# Patient Record
Sex: FEMALE | Race: BLACK OR AFRICAN AMERICAN | NOT HISPANIC OR LATINO | Employment: OTHER | URBAN - METROPOLITAN AREA
[De-identification: names, ages, dates, MRNs, and addresses within clinical notes are randomized per-mention and may not be internally consistent; named-entity substitution may affect disease eponyms.]

---

## 2021-11-06 ENCOUNTER — IMMUNIZATION (OUTPATIENT)
Dept: PRIMARY CARE CLINIC | Facility: CLINIC | Age: 81
End: 2021-11-06
Payer: MEDICARE

## 2021-11-06 DIAGNOSIS — Z23 NEED FOR VACCINATION: Primary | ICD-10-CM

## 2021-11-06 PROCEDURE — 0064A COVID-19, MRNA, LNP-S, PF, 100 MCG/0.25 ML DOSE VACCINE (MODERNA BOOSTER): CPT | Mod: CV19,PBBFAC | Performed by: INTERNAL MEDICINE

## 2022-01-10 ENCOUNTER — TELEPHONE (OUTPATIENT)
Dept: PULMONOLOGY | Facility: CLINIC | Age: 82
End: 2022-01-10
Payer: MEDICARE

## 2022-01-10 NOTE — TELEPHONE ENCOUNTER
Spoke with patient, informed her that I have received her message. I advised patient that I can schedule her appointment with Dr Villarreal on 1/21/22 for 1:00pm. Patient verbalized that she understand and excepted the appointment.

## 2022-01-10 NOTE — TELEPHONE ENCOUNTER
----- Message from Rebeca Cummins sent at 1/10/2022  3:44 PM CST -----  Evans Carcamo calling regarding Appointment Access  (message) for #breathing issues/Sarcoidosis. [phone]

## 2022-01-21 ENCOUNTER — OFFICE VISIT (OUTPATIENT)
Dept: PULMONOLOGY | Facility: CLINIC | Age: 82
End: 2022-01-21
Payer: MEDICARE

## 2022-01-21 VITALS
DIASTOLIC BLOOD PRESSURE: 76 MMHG | SYSTOLIC BLOOD PRESSURE: 128 MMHG | HEART RATE: 106 BPM | WEIGHT: 148.38 LBS | BODY MASS INDEX: 29.13 KG/M2 | HEIGHT: 60 IN | OXYGEN SATURATION: 95 %

## 2022-01-21 DIAGNOSIS — R06.09 DYSPNEA ON EXERTION: Primary | ICD-10-CM

## 2022-01-21 DIAGNOSIS — D86.9 SARCOID: ICD-10-CM

## 2022-01-21 PROCEDURE — 99203 OFFICE O/P NEW LOW 30 MIN: CPT | Mod: GC,S$GLB,, | Performed by: INTERNAL MEDICINE

## 2022-01-21 PROCEDURE — 99999 PR PBB SHADOW E&M-EST. PATIENT-LVL III: CPT | Mod: PBBFAC,GC,, | Performed by: INTERNAL MEDICINE

## 2022-01-21 PROCEDURE — 99999 PR PBB SHADOW E&M-EST. PATIENT-LVL III: ICD-10-PCS | Mod: PBBFAC,GC,, | Performed by: INTERNAL MEDICINE

## 2022-01-21 PROCEDURE — 99203 PR OFFICE/OUTPT VISIT, NEW, LEVL III, 30-44 MIN: ICD-10-PCS | Mod: GC,S$GLB,, | Performed by: INTERNAL MEDICINE

## 2022-01-21 RX ORDER — MUPIROCIN 20 MG/G
OINTMENT TOPICAL
COMMUNITY
Start: 2021-10-04

## 2022-01-21 RX ORDER — LOVASTATIN 40 MG/1
TABLET ORAL
COMMUNITY
Start: 2021-10-20

## 2022-01-21 RX ORDER — AZELASTINE 1 MG/ML
SPRAY, METERED NASAL
COMMUNITY
Start: 2021-10-01 | End: 2023-03-09

## 2022-01-21 RX ORDER — AMLODIPINE BESYLATE 5 MG/1
TABLET ORAL
COMMUNITY
Start: 2021-08-23

## 2022-01-21 RX ORDER — DENOSUMAB 60 MG/ML
INJECTION SUBCUTANEOUS
COMMUNITY
Start: 2021-09-22

## 2022-01-21 RX ORDER — OMEPRAZOLE 40 MG/1
CAPSULE, DELAYED RELEASE ORAL
COMMUNITY
Start: 2021-10-01 | End: 2023-03-09

## 2022-01-21 RX ORDER — SITAGLIPTIN 100 MG/1
TABLET, FILM COATED ORAL
COMMUNITY
Start: 2021-11-03

## 2022-01-21 NOTE — PROGRESS NOTES
[facility] - Pulmonary [facility] 9th Fl  Pulmonary Clinic Note    Subjective:      Reason for Visit: New patient, sarcoidosis    Evans Carcamo is a 81 y.o. female presenting as a new patient to pulmonary clinic for evaluation of sarcoidosis. Diagnosed at 17 via lung biopsy (per patient). Has been asymptomatic since. Never been on therapy. Currently takes MTX but this is for rheumatoid arthritis. Patient sees a private practice PCP on [facility], and all of her other doctors (rheumatology, ENT, ophthalmology) are at [facility]. States she was referred to [facility] pulm for insurance reasons. Reports progressive dyspnea on exertion over the past several years. Limits her ability to grocery shop, carry heavy objects, or walk longer distances. Associated with dry cough. Never smoker, denies history of heart or lung disease. Never been told she has heart failure and denies orthopnea or LE edema. Does endorse chronic sinus congestion with post nasal drip for which she takes Claritin and Azelastine nasal spray and sees ENT.    Last imaging in our system: CXR 2014, normal without hilar LAD, fibrosis, or scarring  No PFTs available for review, though patient states she has had PFTs completed in the past    PMH:  HTN, DM, hypercholesterolemia, RA    PSxH:  Prior lung biopsy    FH:  Brother with heart disease       Review of patient's allergies indicates:  Not on File     Review of Systems   Constitutional: Negative for chills, fever and weight loss.   HENT: Positive for congestion and nosebleeds.    Eyes: Negative for blurred vision and double vision.   Respiratory: Positive for cough and shortness of breath. Negative for hemoptysis, sputum production, wheezing and stridor.    Cardiovascular: Negative for chest pain, palpitations, leg swelling and PND.   Gastrointestinal: Negative for constipation, diarrhea, nausea and vomiting.   Genitourinary: Negative for dysuria and hematuria.   Skin: Negative for itching and rash.    Neurological: Negative for focal weakness and weakness.       Objective:      Vital Signs (Most Recent)       Vital Signs Range (Last 24H):  [unfilled]    Physical Exam  Constitutional:       General: She is not in acute distress.     Appearance: Normal appearance. She is not ill-appearing.   HENT:      Head: Normocephalic and atraumatic.      Mouth/Throat:      Mouth: Mucous membranes are moist.      Pharynx: Oropharynx is clear.   Eyes:      Extraocular Movements: Extraocular movements intact.      Pupils: Pupils are equal, round, and reactive to light.   Cardiovascular:      Rate and Rhythm: Normal rate and regular rhythm.   Pulmonary:      Effort: Pulmonary effort is normal. No respiratory distress.      Breath sounds: Normal breath sounds. No stridor. No wheezing or rhonchi.   Abdominal:      Palpations: Abdomen is soft.      Tenderness: There is no abdominal tenderness. There is no guarding.   Musculoskeletal:         General: Normal range of motion.      Cervical back: Normal range of motion and neck supple.      Right lower leg: No edema.      Left lower leg: No edema.   Skin:     General: Skin is warm and dry.   Neurological:      General: No focal deficit present.      Mental Status: She is alert and oriented to person, place, and time. Mental status is at baseline.   Psychiatric:         Mood and Affect: Mood normal.         Behavior: Behavior normal.         CXR (2014): Normal CXR    Assessment:      82 yo F with history as stated above presents to pulmonary clinic as a new patient. History of sarcoid diagnosed at age 17, reportedly asymptomatic since diagnosis, this seems to be consistent when reviewing CXR from 2014 which shows no abnormalities. She does report a progressive dyspnea on exertion over the past several years of unknown etiology. Highly unlikely to be related to sarcoidosis. Will evaluate with PFTs, 6-min walk and CXR imaging for now. Follow up pending results of these  studies.    Plan:      1. Dyspnea on exertion  - PFTs  - 6-min walk  - CXR    Patient seen and discussed with Dr. Davison. Follow up with pulmonary to be determined based on test results.    Saji Villarreal MD  Pulmonary / Critical Care, PGY-5

## 2022-02-24 ENCOUNTER — HOSPITAL ENCOUNTER (OUTPATIENT)
Dept: RADIOLOGY | Facility: HOSPITAL | Age: 82
Discharge: HOME | End: 2022-02-24
Attending: INTERNAL MEDICINE
Payer: MEDICARE

## 2022-02-24 DIAGNOSIS — R06.09 DYSPNEA ON EXERTION: ICD-10-CM

## 2022-02-24 PROCEDURE — 71046 XR CHEST PA AND LATERAL: ICD-10-PCS | Mod: 26,,, | Performed by: RADIOLOGY

## 2022-02-24 PROCEDURE — 71046 X-RAY EXAM CHEST 2 VIEWS: CPT | Mod: 26,,, | Performed by: RADIOLOGY

## 2022-02-24 PROCEDURE — 71046 X-RAY EXAM CHEST 2 VIEWS: CPT | Mod: TC,FY

## 2022-03-17 ENCOUNTER — TELEPHONE (OUTPATIENT)
Dept: PULMONOLOGY | Facility: CLINIC | Age: 82
End: 2022-03-17
Payer: MEDICARE

## 2022-03-17 NOTE — TELEPHONE ENCOUNTER
I spoke to patient to schedule pft's and 6mwt ordered by Dr Villarreal on 3/22/22 at 10am. Patient aware of rapid covid test prior. Patient confirmed and verbalized understanding.

## 2022-03-17 NOTE — TELEPHONE ENCOUNTER
----- Message from Kandy Waldrop sent at 3/17/2022 11:13 AM CDT -----  Contact: pt  Pt requesting call back re: scheduling PFT labs.     Confirmed contact below:  Contact Name:Evans Carcamo  Phone Number: [phone]

## 2022-03-22 ENCOUNTER — HOSPITAL ENCOUNTER (OUTPATIENT)
Dept: PULMONOLOGY | Facility: CLINIC | Age: 82
Discharge: HOME | End: 2022-03-22
Payer: MEDICARE

## 2022-03-22 VITALS — BODY MASS INDEX: 27.68 KG/M2 | WEIGHT: 146.63 LBS | HEIGHT: 61 IN

## 2022-03-22 DIAGNOSIS — R06.09 DYSPNEA ON EXERTION: ICD-10-CM

## 2022-03-22 PROCEDURE — 94727 GAS DIL/WSHOT DETER LNG VOL: CPT | Mod: S$GLB,,, | Performed by: INTERNAL MEDICINE

## 2022-03-22 PROCEDURE — 94010 BREATHING CAPACITY TEST: ICD-10-PCS | Mod: S$GLB,,, | Performed by: INTERNAL MEDICINE

## 2022-03-22 PROCEDURE — 94618 PULMONARY STRESS TESTING: ICD-10-PCS | Mod: S$GLB,,, | Performed by: INTERNAL MEDICINE

## 2022-03-22 PROCEDURE — 94727 PR PULM FUNCTION TEST BY GAS: ICD-10-PCS | Mod: S$GLB,,, | Performed by: INTERNAL MEDICINE

## 2022-03-22 PROCEDURE — 94729 PR C02/MEMBANE DIFFUSE CAPACITY: ICD-10-PCS | Mod: S$GLB,,, | Performed by: INTERNAL MEDICINE

## 2022-03-22 PROCEDURE — 94010 BREATHING CAPACITY TEST: CPT | Mod: S$GLB,,, | Performed by: INTERNAL MEDICINE

## 2022-03-22 PROCEDURE — 94618 PULMONARY STRESS TESTING: CPT | Mod: S$GLB,,, | Performed by: INTERNAL MEDICINE

## 2022-03-22 PROCEDURE — 94729 DIFFUSING CAPACITY: CPT | Mod: S$GLB,,, | Performed by: INTERNAL MEDICINE

## 2022-03-23 NOTE — PROCEDURES
Evans Carcamo is a 81 y.o.  female patient, who presents for a 6 minute walk test ordered by MD Phil.  The diagnosis is Qualify for Oxygen, Shortness of Breath.  The patient's BMI is 27.7 kg/m2.  Predicted distance (lower limit of normal) is 227.09 meters.      Test Results:    The test was completed without stopping.  The total time walked was 360 seconds.  During walking, the patient reported:  Dyspnea, Leg pain. The patient used no assistive devices  during testing.     03/22/2022---------Distance: 365.76 meters (1200 feet)     O2 Sat % Supplemental Oxygen Heart Rate Blood Pressure Emerita Scale   Pre-exercise  (Resting) 97 % Room Air 103 bpm 159/75 mmHg 3   During Exercise 98 % Room Air 108 bpm 132/63 mmHg 4   Post-exercise  (Recovery) 98 % Room Air  100 bpm   mmHg       Recovery Time: 89 seconds    Performing nurse/tech: Shilpa THORNTON      PREVIOUS STUDY:   The patient has not had a previous study.      CLINICAL INTERPRETATION:  Six minute walk distance is 365.76 meters (1200 feet) with somewhat heavy dyspnea.  During exercise, there was no significant desaturation while breathing room air.  Blood pressure decreased significantly and Heart rate remained stable with walking.  Hypertension and Tachycardia was present prior to exercise.  This may represent an abnormal cardiovascular response to exercise.  The patient reported non-pulmonary symptoms during exercise.  No previous study performed.  Based upon age and body mass index, exercise capacity is normal.

## 2022-03-29 ENCOUNTER — TELEPHONE (OUTPATIENT)
Dept: PULMONOLOGY | Facility: CLINIC | Age: 82
End: 2022-03-29
Payer: MEDICARE

## 2022-03-29 NOTE — TELEPHONE ENCOUNTER
Pt calling for pft's results from 3/23/22. I let her know I would send a message to Dr Villarreal for his review and to advise. Patient verbalized understanding.

## 2022-04-01 DIAGNOSIS — J84.9 INTERSTITIAL PULMONARY DISEASE, UNSPECIFIED: ICD-10-CM

## 2022-04-01 DIAGNOSIS — R05.9 COUGH: ICD-10-CM

## 2022-05-08 NOTE — PROGRESS NOTES
I have seen the patient, reviewed all pertinent data in Epic, and reviewed the fellow's history and physical, assessment, and plan. I agree with the findings and recommendations as documented.     08-May-2022 19:45

## 2022-06-10 ENCOUNTER — TELEPHONE (OUTPATIENT)
Dept: PULMONOLOGY | Facility: CLINIC | Age: 82
End: 2022-06-10
Payer: MEDICARE

## 2022-06-10 NOTE — TELEPHONE ENCOUNTER
I spoke with patient to let her know at this time I do not have any availability to reschedule her appointment with Dr Villarreal. Patient stated she rescheduled her ct scan for Monday, 6/13/22 at 10:15am at [place]. I let Ms Carcamo know when I have an available appointment I can follow up to schedule an appointment. Patient verbalized understanding.

## 2022-06-10 NOTE — TELEPHONE ENCOUNTER
----- Message from Manuela Rendon sent at 6/10/2022 12:18 PM CDT -----  Regarding: Appt  Contact: PT @ [phone]  Pt is calling to speak to someone in Dr. Villarreal office to r/s appt for today, 6/10/22 due to bad weather. No available appts in Epic. Please call.

## 2022-06-13 ENCOUNTER — HOSPITAL ENCOUNTER (OUTPATIENT)
Dept: RADIOLOGY | Facility: HOSPITAL | Age: 82
Discharge: HOME | End: 2022-06-13
Attending: INTERNAL MEDICINE
Payer: MEDICARE

## 2022-06-13 DIAGNOSIS — J84.9 INTERSTITIAL PULMONARY DISEASE, UNSPECIFIED: ICD-10-CM

## 2022-06-13 DIAGNOSIS — R05.9 COUGH: ICD-10-CM

## 2022-06-13 PROCEDURE — 71250 CT THORAX DX C-: CPT | Mod: TC

## 2022-06-22 DIAGNOSIS — R06.09 DYSPNEA ON EXERTION: Primary | ICD-10-CM

## 2022-06-22 DIAGNOSIS — J84.9 INTERSTITIAL LUNG DISEASE: ICD-10-CM

## 2022-06-22 NOTE — PROGRESS NOTES
Called Ms. Carcamo to discuss CT scan findings. There are areas of fibrosis/bronchiectasis as well as mild ILD process concerning for underlying NSIP. This in combination with patient's symptoms of dyspnea on exertion and PFTs showing restriction with reduced DLCO, may be early ILD in setting of rheumatoid arthritis. Patient also with a history of sarcoidosis, but I do not feel CT pattern is consistent with this. Additionally, seems patient's symptoms of dyspnea on exertion are out of proportion to disease burden seen on CT scan. Therefore, I have ordered a TTE to evaluate for underlying pulmonary hypertension. I would like for Ms. Carcamo to return to pulmonary clinic after her TTE to discuss results and treatment options. This has been explained in detail to Ms. Carcamo.    Saji Villarreal MD  Pulmonary / Critical Care, PGY-5

## 2022-06-25 ENCOUNTER — IMMUNIZATION (OUTPATIENT)
Dept: PRIMARY CARE CLINIC | Facility: CLINIC | Age: 82
End: 2022-06-25
Payer: MEDICARE

## 2022-06-25 DIAGNOSIS — Z23 NEED FOR VACCINATION: Primary | ICD-10-CM

## 2022-06-25 PROCEDURE — 91306 COVID-19, MRNA, LNP-S, PF, 100 MCG/0.25 ML DOSE VACCINE (MODERNA BOOSTER): CPT | Mod: PBBFAC | Performed by: INTERNAL MEDICINE

## 2022-06-25 PROCEDURE — 0064A COVID-19, MRNA, LNP-S, PF, 100 MCG/0.25 ML DOSE VACCINE (MODERNA BOOSTER): CPT | Mod: CV19,PBBFAC | Performed by: INTERNAL MEDICINE

## 2022-10-07 ENCOUNTER — OFFICE VISIT (OUTPATIENT)
Dept: PULMONOLOGY | Facility: CLINIC | Age: 82
End: 2022-10-07
Payer: MEDICARE

## 2022-10-07 VITALS
SYSTOLIC BLOOD PRESSURE: 128 MMHG | HEIGHT: 61 IN | OXYGEN SATURATION: 93 % | DIASTOLIC BLOOD PRESSURE: 68 MMHG | HEART RATE: 109 BPM | WEIGHT: 138.44 LBS | BODY MASS INDEX: 26.14 KG/M2

## 2022-10-07 DIAGNOSIS — J84.9 INTERSTITIAL LUNG DISEASE: Primary | ICD-10-CM

## 2022-10-07 DIAGNOSIS — R06.09 DYSPNEA ON EXERTION: ICD-10-CM

## 2022-10-07 PROBLEM — E11.9 TYPE 2 DIABETES MELLITUS WITHOUT COMPLICATION, WITHOUT LONG-TERM CURRENT USE OF INSULIN: Status: ACTIVE | Noted: 2022-10-07

## 2022-10-07 PROCEDURE — 3074F SYST BP LT 130 MM HG: CPT | Mod: CPTII,S$GLB,, | Performed by: INTERNAL MEDICINE

## 2022-10-07 PROCEDURE — 3288F FALL RISK ASSESSMENT DOCD: CPT | Mod: CPTII,S$GLB,, | Performed by: INTERNAL MEDICINE

## 2022-10-07 PROCEDURE — 3078F PR MOST RECENT DIASTOLIC BLOOD PRESSURE < 80 MM HG: ICD-10-PCS | Mod: CPTII,S$GLB,, | Performed by: INTERNAL MEDICINE

## 2022-10-07 PROCEDURE — 1101F PR PT FALLS ASSESS DOC 0-1 FALLS W/OUT INJ PAST YR: ICD-10-PCS | Mod: CPTII,S$GLB,, | Performed by: INTERNAL MEDICINE

## 2022-10-07 PROCEDURE — 1101F PT FALLS ASSESS-DOCD LE1/YR: CPT | Mod: CPTII,S$GLB,, | Performed by: INTERNAL MEDICINE

## 2022-10-07 PROCEDURE — 1159F MED LIST DOCD IN RCRD: CPT | Mod: CPTII,S$GLB,, | Performed by: INTERNAL MEDICINE

## 2022-10-07 PROCEDURE — 1126F AMNT PAIN NOTED NONE PRSNT: CPT | Mod: CPTII,S$GLB,, | Performed by: INTERNAL MEDICINE

## 2022-10-07 PROCEDURE — 1126F PR PAIN SEVERITY QUANTIFIED, NO PAIN PRESENT: ICD-10-PCS | Mod: CPTII,S$GLB,, | Performed by: INTERNAL MEDICINE

## 2022-10-07 PROCEDURE — 3074F PR MOST RECENT SYSTOLIC BLOOD PRESSURE < 130 MM HG: ICD-10-PCS | Mod: CPTII,S$GLB,, | Performed by: INTERNAL MEDICINE

## 2022-10-07 PROCEDURE — 99999 PR PBB SHADOW E&M-EST. PATIENT-LVL III: ICD-10-PCS | Mod: PBBFAC,,, | Performed by: INTERNAL MEDICINE

## 2022-10-07 PROCEDURE — 99213 PR OFFICE/OUTPT VISIT, EST, LEVL III, 20-29 MIN: ICD-10-PCS | Mod: S$GLB,,, | Performed by: INTERNAL MEDICINE

## 2022-10-07 PROCEDURE — 99213 OFFICE O/P EST LOW 20 MIN: CPT | Mod: S$GLB,,, | Performed by: INTERNAL MEDICINE

## 2022-10-07 PROCEDURE — 1159F PR MEDICATION LIST DOCUMENTED IN MEDICAL RECORD: ICD-10-PCS | Mod: CPTII,S$GLB,, | Performed by: INTERNAL MEDICINE

## 2022-10-07 PROCEDURE — 3078F DIAST BP <80 MM HG: CPT | Mod: CPTII,S$GLB,, | Performed by: INTERNAL MEDICINE

## 2022-10-07 PROCEDURE — 3288F PR FALLS RISK ASSESSMENT DOCUMENTED: ICD-10-PCS | Mod: CPTII,S$GLB,, | Performed by: INTERNAL MEDICINE

## 2022-10-07 PROCEDURE — 99999 PR PBB SHADOW E&M-EST. PATIENT-LVL III: CPT | Mod: PBBFAC,,, | Performed by: INTERNAL MEDICINE

## 2022-10-07 RX ORDER — PREDNISONE 20 MG/1
40 TABLET ORAL DAILY
Qty: 5 TABLET | Refills: 0 | Status: SHIPPED | OUTPATIENT
Start: 2022-10-07 | End: 2023-03-09

## 2022-10-07 NOTE — PROGRESS NOTES
Tobacco/vape:  Occupation:  Exertional capacity:  Prior lung disease:  Sputum production:  Allergies/sinusitis:  GERD/Aspiration:  Pets:  Travel/TB:  Respiratory regimen:  Prior cancer:  Prior hospitalization/intubation:  Snoring/apnea:

## 2022-10-07 NOTE — PROGRESS NOTES
Subjective:       Patient ID: Evans Carcamo is a 82 y.o. female.    Chief Complaint: Shortness of Breath    HPI  Evans Carcamo has a past medical history of RA, sarcoid as a teenager, DM2 controlled, HTN, HLD    Tobacco/vape: never smoked, says she has some second hand exposure with ex- but was not around him long periods  Occupation: Pre-   Exertional capacity: 10 ft (recent change over the last two weeks, was functional able to walk through [employer] with some difficulty prior to new onset)  Prior lung disease: Was told sarcoid as a teenager, was never treated for it or given anything, Was told by Dr. Villarreal she had RA affecting her lungs.   Sputum production: Never  Allergies/sinusitis:Some history of allergies with the seasons, but not anymore  GERD/Aspiration: No reflux, or trouble with swallowing, recent nausea, says for inflammation in her stomach  Pets: Daughter has 6 dogs, no increased symptoms around pets  Travel/TB: No extensive travel or living in foreign country  Respiratory regimen: never had an inhaler or breathing treatment  Prior cancer: None  Prior hospitalization/intubation:No prior hospitalizations for issues with her lungs or requiring intubation.  Snoring/apnea: Bad insomnia, No one sleeps with her, she does not know of episodes that she stops breathing    Today she is Very tried, drained, weak, no energy, feels better than when she was in the ED, From SOB has improved, no cough, worse early in the morning and with exertion. Can walk about 10 ft before she gets SOB, but does not have it at rest. No LE swelling. No sores on hands. SOB is all the time, nothing makes it better, activities makes worse. COVID 2 years ago, did not have to go to the hospital, did not have any long term side effects of it.    Review of Systems   Constitutional:  Positive for activity change (decreased due to SOB) and fatigue. Negative for fever and chills.   HENT:  Negative for rhinorrhea,  "sinus pressure, sore throat and trouble swallowing.    Respiratory:  Positive for previous hospitalization due to pulmonary problems and somnolence. Negative for apnea, snoring, cough, sputum production, shortness of breath, orthopnea, use of rescue inhaler and Paroxysmal Nocturnal Dyspnea.    Cardiovascular:  Negative for chest pain and leg swelling.   Musculoskeletal:  Negative for gait problem and myalgias.   Gastrointestinal:  Positive for nausea and vomiting. Negative for acid reflux.   Psychiatric/Behavioral:  Negative for confusion. The patient is not nervous/anxious.      Objective:      Vitals:    10/07/22 0857   BP: 128/68   BP Location: Right arm   Patient Position: Sitting   Pulse: 109   SpO2: (!) 93%   Weight: 62.8 kg (138 lb 7.2 oz)   Height: 5' 1" (1.549 m)       Physical Exam   Constitutional: She is oriented to person, place, and time. She appears well-developed and well-nourished. No distress.   HENT:   Head: Normocephalic.   Cardiovascular: Regular rhythm and normal heart sounds.   No murmur heard.  tachycardic   Pulmonary/Chest: Breath sounds normal. No respiratory distress (mild increase in work of breathing). She has no decreased breath sounds. She has no wheezes.   Pt had increased work of breathing and was breathing slightly heavy. No abnormal breath sounds noted. Good air movement in lungs     Abdominal: Soft. Bowel sounds are normal. She exhibits no distension. There is no abdominal tenderness.   Musculoskeletal:         General: No edema.   Neurological: She is alert and oriented to person, place, and time.   Skin: Skin is warm and dry.   Psychiatric: She has a normal mood and affect. Her behavior is normal. Judgment and thought content normal.     Personal Diagnostic Review  PFTs   FEV1/FVC - 85%  FEV1 - 1.29L (89%)  FVC - 1.52L (79%)  TLC - 2.5L (56%)  DLCO - 58%  Bronchodilators:    CT Thorax 6/13/2022  ---There is mild bilateral panlobar bronchiectasis, and there is a small area of " paraspinal medial right lower lobe fibrosis and associated traction bronchiectasis. ---There is also milder but more generalized interstitial disease, with scattered mild peripheral panlobar interlobular septal thickening, in a pattern which may represent nonspecific interstitial pneumonia (NSIP). ---There are 3 mm noncalcified anterolateral and posterolateral inferior right upper lobe pulmonary nodules: Assuming that no prior chest CT scan exists, for patients at low risk (minimal or absent history of smoking and of other known risk factors), no routine follow-up is indicated. For patients at high risk (history of smoking or of other known risk factors), consider optional CT Chest at 12 months. Reference: Dima H, et al. Guidelines for Management of Incidental Pulmonary Nodules Detected on CT Images: From the Fleischner Society 2017. Radiology. 2017;284(1):228-243. ---There is also a large gallstone,  and additional chronic findings as described above.     CT Thorax 9/2022 ([facility] [place] with no images available)  There is normal enhancement of the main pulmonary arteries and branches.   Atherosclerosis of the aorta with no dissection. Calcified coronary arteries. Heart size is within normal limits. There is interlobular and intralobular septal thickening throughout both lungs, mostly in the periphery. There is no focal airspace consolidation. No significant pleural effusions.   There is no evidence of adenopathy.     Thoracic spondylosis.     Pulmonary Function Tests 10/7/2022   SpO2 93   Height 61   Weight 2215.18   BMI (Calculated) 26.2   Some recent data might be hidden         Assessment:       1. Interstitial lung disease    2. Dyspnea on exertion        Outpatient Encounter Medications as of 10/7/2022   Medication Sig Dispense Refill    amLODIPine (NORVASC) 5 MG tablet       azelastine (ASTELIN) 137 mcg (0.1 %) nasal spray       JANUVIA 100 mg Tab       lovastatin (MEVACOR) 40 MG tablet       mupirocin  (BACTROBAN) 2 % ointment       omeprazole (PRILOSEC) 40 MG capsule       PROLIA 60 mg/mL Syrg       predniSONE (DELTASONE) 20 MG tablet Take 2 tablets (40 mg total) by mouth once daily. 5 tablet 0     No facility-administered encounter medications on file as of 10/7/2022.     Orders Placed This Encounter   Procedures    Complete PFT w/ bronchodilator     Standing Status:   Future     Standing Expiration Date:   10/7/2023     Order Specific Question:   Release to patient     Answer:   Immediate    Six Minute Walk Test to qualify for Home Oxygen     Standing Status:   Future     Standing Expiration Date:   10/7/2023     Order Specific Question:   Release to patient     Answer:   Immediate       Plan:       - 6MWT  - PFTs repeat  - get TTE (previously ordered by Dr. Villarreal)  - patient asked to bring CD from CT thorax at [facility]  - Placed pt on 40mg prednisone for 5 days.  - Follow-up in a month to look for improvement in symptoms.

## 2022-10-11 ENCOUNTER — TELEPHONE (OUTPATIENT)
Dept: PULMONOLOGY | Facility: CLINIC | Age: 82
End: 2022-10-11
Payer: MEDICARE

## 2022-10-11 NOTE — TELEPHONE ENCOUNTER
----- Message from Rachel West sent at 10/11/2022 12:56 PM CDT -----  Evans Carcamo calling regarding Appointment Access  (message) for for the PFT and the Six minute walk, was unable to schedule for the PFT for the same day, call back [phone]

## 2022-10-11 NOTE — TELEPHONE ENCOUNTER
I spoke with patient. Ms Carcamo will call [facility] to get pft's and 6mwt scheduled prior to 1mth f/u with Dr Rodriguez on 11/4/22 at 1pm. Patient verbalized understanding.

## 2022-10-18 ENCOUNTER — HOSPITAL ENCOUNTER (OUTPATIENT)
Dept: PULMONOLOGY | Facility: HOSPITAL | Age: 82
Discharge: HOME | End: 2022-10-18
Attending: INTERNAL MEDICINE
Payer: MEDICARE

## 2022-10-18 DIAGNOSIS — J84.9 INTERSTITIAL LUNG DISEASE: ICD-10-CM

## 2022-10-18 PROCEDURE — 94618 PULMONARY STRESS TESTING: CPT | Mod: 26,,, | Performed by: INTERNAL MEDICINE

## 2022-10-18 PROCEDURE — 94729 DIFFUSING CAPACITY: CPT

## 2022-10-18 PROCEDURE — 94729 DIFFUSING CAPACITY: CPT | Mod: 26,,, | Performed by: INTERNAL MEDICINE

## 2022-10-18 PROCEDURE — 94010 BREATHING CAPACITY TEST: CPT | Mod: 26,59,, | Performed by: INTERNAL MEDICINE

## 2022-10-18 PROCEDURE — 94010 BREATHING CAPACITY TEST: ICD-10-PCS | Mod: 26,59,, | Performed by: INTERNAL MEDICINE

## 2022-10-18 PROCEDURE — 94640 AIRWAY INHALATION TREATMENT: CPT

## 2022-10-18 PROCEDURE — 94618 PULMONARY STRESS TESTING: CPT

## 2022-10-18 PROCEDURE — 94727 GAS DIL/WSHOT DETER LNG VOL: CPT | Mod: 26,,, | Performed by: INTERNAL MEDICINE

## 2022-10-18 PROCEDURE — 94010 BREATHING CAPACITY TEST: CPT

## 2022-10-18 PROCEDURE — 94727 GAS DIL/WSHOT DETER LNG VOL: CPT

## 2022-10-18 PROCEDURE — 94729 PR C02/MEMBANE DIFFUSE CAPACITY: ICD-10-PCS | Mod: 26,,, | Performed by: INTERNAL MEDICINE

## 2022-10-18 PROCEDURE — 94727 PR PULM FUNCTION TEST BY GAS: ICD-10-PCS | Mod: 26,,, | Performed by: INTERNAL MEDICINE

## 2022-10-18 PROCEDURE — 94618 PULMONARY STRESS TESTING: ICD-10-PCS | Mod: 26,,, | Performed by: INTERNAL MEDICINE

## 2022-10-20 LAB
BRPFT: ABNORMAL
DLCO ADJ PRE: 10.87 ML/(MIN*MMHG) (ref 11.8–23.27)
DLCO SINGLE BREATH LLN: 11.8
DLCO SINGLE BREATH PRE REF: 60.4 %
DLCO SINGLE BREATH REF: 17.53
DLCOC SBVA LLN: 2.39
DLCOC SBVA PRE REF: 118.3 %
DLCOC SBVA REF: 3.95
DLCOC SINGLE BREATH LLN: 11.8
DLCOC SINGLE BREATH PRE REF: 62 %
DLCOC SINGLE BREATH REF: 17.53
DLCOVA LLN: 2.39
DLCOVA PRE REF: 115.3 %
DLCOVA PRE: 4.55 ML/(MIN*MMHG*L) (ref 2.39–5.51)
DLCOVA REF: 3.95
DLVAADJ PRE: 4.67 ML/(MIN*MMHG*L) (ref 2.39–5.51)
ERVN2 LLN: -16449.58
ERVN2 PRE REF: 64.3 %
ERVN2 PRE: 0.27 L (ref -16449.58–16450.42)
ERVN2 REF: 0.42
FEF 25 75 LLN: 0.39
FEF 25 75 PRE REF: 108.5 %
FEF 25 75 REF: 1.21
FEV1 FVC LLN: 63
FEV1 FVC PRE REF: 106.2 %
FEV1 FVC REF: 77
FEV1 LLN: 0.95
FEV1 PRE REF: 73.8 %
FEV1 REF: 1.45
FRCN2 LLN: 1.73
FRCN2 PRE REF: 37.9 %
FRCN2 REF: 2.56
FVC LLN: 1.26
FVC PRE REF: 68.7 %
FVC REF: 1.89
IVC PRE: 1.31 L (ref 1.26–2.52)
IVC SINGLE BREATH LLN: 1.26
IVC SINGLE BREATH PRE REF: 69.6 %
IVC SINGLE BREATH REF: 1.89
PEF LLN: 1.32
PEF PRE REF: 113.4 %
PEF REF: 3.14
PRE DLCO: 10.59 ML/(MIN*MMHG) (ref 11.8–23.27)
PRE FEF 25 75: 1.32 L/S (ref 0.39–2.04)
PRE FET 100: 6.57 SEC
PRE FEV1 FVC: 82.33 % (ref 62.76–92.23)
PRE FEV1: 1.07 L (ref 0.95–1.94)
PRE FRC N2: 0.97 L
PRE FVC: 1.3 L (ref 1.26–2.52)
PRE PEF: 3.56 L/S (ref 1.32–4.96)
RVN2 LLN: 1.56
RVN2 PRE REF: 21.5 %
RVN2 PRE: 0.46 L (ref 1.56–2.71)
RVN2 REF: 2.13
RVN2TLCN2 LLN: 37.59
RVN2TLCN2 PRE REF: 52.7 %
RVN2TLCN2 PRE: 24.88 % (ref 37.59–56.77)
RVN2TLCN2 REF: 47.18
TLCN2 LLN: 3.45
TLCN2 PRE REF: 41.5 %
TLCN2 PRE: 1.84 L (ref 3.45–5.43)
TLCN2 REF: 4.44
VA PRE: 2.33 L (ref 4.29–4.29)
VA SINGLE BREATH LLN: 4.29
VA SINGLE BREATH PRE REF: 54.2 %
VA SINGLE BREATH REF: 4.29
VCMAXN2 LLN: 1.26
VCMAXN2 PRE REF: 73.3 %
VCMAXN2 PRE: 1.38 L (ref 1.26–2.52)
VCMAXN2 REF: 1.89

## 2022-10-20 NOTE — PROCEDURES
Evans Carcamo is a 82 y.o.   female patient, who presents for a 6 minute walk test ordered by Dr Castillo .  The diagnosis is  COPD.  The patient's BMI is  26 kg/m2.    Predicted distance (lower limit of normal) is  251 meters.      Test Results:    The test was completed .completed  The patient stopped  0 times for a total of 0 seconds.  The total time walked was 360 seconds.  During walking, the patient reported:   no issues.    10/20/2022---------Distance:   (235.31m )     O2 Sat % Supplemental Oxygen Heart Rate Blood Pressure Emerita Scale   Pre-exercise  (Resting)  96  139/68 4   During Exercise 95 RA  106 128/60 6   Post-exercise  (Recovery) 96  130/62 4     Recovery Time:  35 seconds    Performing nurse/tech:  ROBYN Pittman [facility]      SUMMARY/INTERPRETATION:    Total distance walked: 235  meters  Predicted distance: 390  meters  Percentage predicted: 60 %    PREVIOUS STUDY:     Date:  2/2022  Prior 6 minute walk:   355meters  Percentage change:      The patient had a previous study.      CLINICAL INTERPRETATION:  Six minute walk distance is   (235 ) with somewhat heavy dyspnea.  Since the previous study in 2/2022, exercise capacity may be somewhat worse.    No desaturation on 6 minute walk.

## 2022-10-25 ENCOUNTER — TELEPHONE (OUTPATIENT)
Dept: PULMONOLOGY | Facility: CLINIC | Age: 82
End: 2022-10-25
Payer: MEDICARE

## 2022-10-25 NOTE — TELEPHONE ENCOUNTER
I spoke with patient. I let her know appointment with Dr Rodriguez on 11/4/22 at 1pm needs to changed to 12/6/22 at 4pm due to schedule change. Appointment mailed. Patient verbalized understanding.

## 2022-12-06 ENCOUNTER — OFFICE VISIT (OUTPATIENT)
Dept: PULMONOLOGY | Facility: CLINIC | Age: 82
End: 2022-12-06
Payer: MEDICARE

## 2022-12-06 VITALS
DIASTOLIC BLOOD PRESSURE: 70 MMHG | HEIGHT: 61 IN | HEART RATE: 107 BPM | WEIGHT: 143.31 LBS | BODY MASS INDEX: 27.06 KG/M2 | OXYGEN SATURATION: 95 % | SYSTOLIC BLOOD PRESSURE: 128 MMHG

## 2022-12-06 DIAGNOSIS — R06.09 DYSPNEA ON EXERTION: ICD-10-CM

## 2022-12-06 DIAGNOSIS — K21.9 GASTROESOPHAGEAL REFLUX DISEASE WITHOUT ESOPHAGITIS: ICD-10-CM

## 2022-12-06 DIAGNOSIS — G47.00 INSOMNIA, UNSPECIFIED TYPE: ICD-10-CM

## 2022-12-06 DIAGNOSIS — J84.9 INTERSTITIAL PULMONARY DISEASE, UNSPECIFIED: Primary | ICD-10-CM

## 2022-12-06 PROCEDURE — 1126F AMNT PAIN NOTED NONE PRSNT: CPT | Mod: CPTII,S$GLB,, | Performed by: INTERNAL MEDICINE

## 2022-12-06 PROCEDURE — 1160F PR REVIEW ALL MEDS BY PRESCRIBER/CLIN PHARMACIST DOCUMENTED: ICD-10-PCS | Mod: CPTII,S$GLB,, | Performed by: INTERNAL MEDICINE

## 2022-12-06 PROCEDURE — 3078F PR MOST RECENT DIASTOLIC BLOOD PRESSURE < 80 MM HG: ICD-10-PCS | Mod: CPTII,S$GLB,, | Performed by: INTERNAL MEDICINE

## 2022-12-06 PROCEDURE — 99999 PR PBB SHADOW E&M-EST. PATIENT-LVL IV: CPT | Mod: PBBFAC,,, | Performed by: INTERNAL MEDICINE

## 2022-12-06 PROCEDURE — 3288F FALL RISK ASSESSMENT DOCD: CPT | Mod: CPTII,S$GLB,, | Performed by: INTERNAL MEDICINE

## 2022-12-06 PROCEDURE — 99214 PR OFFICE/OUTPT VISIT, EST, LEVL IV, 30-39 MIN: ICD-10-PCS | Mod: S$GLB,,, | Performed by: INTERNAL MEDICINE

## 2022-12-06 PROCEDURE — 1101F PT FALLS ASSESS-DOCD LE1/YR: CPT | Mod: CPTII,S$GLB,, | Performed by: INTERNAL MEDICINE

## 2022-12-06 PROCEDURE — 3074F PR MOST RECENT SYSTOLIC BLOOD PRESSURE < 130 MM HG: ICD-10-PCS | Mod: CPTII,S$GLB,, | Performed by: INTERNAL MEDICINE

## 2022-12-06 PROCEDURE — 1126F PR PAIN SEVERITY QUANTIFIED, NO PAIN PRESENT: ICD-10-PCS | Mod: CPTII,S$GLB,, | Performed by: INTERNAL MEDICINE

## 2022-12-06 PROCEDURE — 3074F SYST BP LT 130 MM HG: CPT | Mod: CPTII,S$GLB,, | Performed by: INTERNAL MEDICINE

## 2022-12-06 PROCEDURE — 99214 OFFICE O/P EST MOD 30 MIN: CPT | Mod: S$GLB,,, | Performed by: INTERNAL MEDICINE

## 2022-12-06 PROCEDURE — 1159F MED LIST DOCD IN RCRD: CPT | Mod: CPTII,S$GLB,, | Performed by: INTERNAL MEDICINE

## 2022-12-06 PROCEDURE — 3078F DIAST BP <80 MM HG: CPT | Mod: CPTII,S$GLB,, | Performed by: INTERNAL MEDICINE

## 2022-12-06 PROCEDURE — 1159F PR MEDICATION LIST DOCUMENTED IN MEDICAL RECORD: ICD-10-PCS | Mod: CPTII,S$GLB,, | Performed by: INTERNAL MEDICINE

## 2022-12-06 PROCEDURE — 1160F RVW MEDS BY RX/DR IN RCRD: CPT | Mod: CPTII,S$GLB,, | Performed by: INTERNAL MEDICINE

## 2022-12-06 PROCEDURE — 1101F PR PT FALLS ASSESS DOC 0-1 FALLS W/OUT INJ PAST YR: ICD-10-PCS | Mod: CPTII,S$GLB,, | Performed by: INTERNAL MEDICINE

## 2022-12-06 PROCEDURE — 3288F PR FALLS RISK ASSESSMENT DOCUMENTED: ICD-10-PCS | Mod: CPTII,S$GLB,, | Performed by: INTERNAL MEDICINE

## 2022-12-06 PROCEDURE — 99999 PR PBB SHADOW E&M-EST. PATIENT-LVL IV: ICD-10-PCS | Mod: PBBFAC,,, | Performed by: INTERNAL MEDICINE

## 2022-12-06 RX ORDER — SOLIFENACIN SUCCINATE 5 MG/1
5 TABLET, FILM COATED ORAL
COMMUNITY
Start: 2022-10-27 | End: 2023-10-27

## 2022-12-06 RX ORDER — LISINOPRIL 2.5 MG/1
2.5 TABLET ORAL
COMMUNITY
Start: 2022-07-01 | End: 2023-03-09

## 2022-12-06 RX ORDER — METHOTREXATE 2.5 MG/1
12.5 TABLET ORAL WEEKLY
COMMUNITY
Start: 2022-10-06

## 2022-12-06 NOTE — PROGRESS NOTES
Subjective:       Patient ID: Evans Carcamo is a 82 y.o. female.    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    Tobacco/vape: never   Occupation: Pre-   Exertional capacity: limited severely due to shortness of breath. Able to walk to clinic from parking lot.  Prior lung disease: Was told sarcoid as a teenager, RA affecting her lungs.   Sputum production: Never  Allergies/sinusitis:Some history of allergies with the seasons, but not anymore  GERD/Aspiration: taking omeprazole  Pets: Daughter has 6 dogs, no increased symptoms around pets  Travel/TB: No extensive travel or living in foreign country  Respiratory regimen: never had an inhaler or breathing treatment  Prior cancer: None  Prior hospitalization/intubation:No prior hospitalizations for issues with her lungs or requiring intubation.  Snoring/apnea: Bad insomnia, No one sleeps with her, she does not know of episodes that she stops breathing    Today she is doing ok, but is having difficulty with sleep.  She attributes anxiety about day to day life as the cause.  Also with continued shortness of breath.    Review of Systems   Constitutional:  Positive for activity change (decreased due to SOB) and fatigue. Negative for fever and chills.   HENT:  Negative for rhinorrhea, sinus pressure, sore throat and trouble swallowing.    Respiratory:  Negative for apnea, snoring, cough, sputum production, shortness of breath, orthopnea, previous hospitialization due to pulmonary problems, use of rescue inhaler, somnolence and Paroxysmal Nocturnal Dyspnea.    Cardiovascular:  Negative for chest pain and leg swelling.   Musculoskeletal:  Negative for gait problem and myalgias.   Gastrointestinal:  Positive for nausea and vomiting. Negative for acid reflux.   Psychiatric/Behavioral:  Negative for confusion. The patient is  "not nervous/anxious.      Objective:      Vitals:    12/06/22 1550   BP: 128/70   BP Location: Right arm   Patient Position: Sitting   Pulse: 107   SpO2: 95%   Weight: 65 kg (143 lb 4.8 oz)   Height: 5' 1" (1.549 m)         Physical Exam   Constitutional: She is oriented to person, place, and time. She appears well-developed and well-nourished. No distress.   HENT:   Head: Normocephalic.   Cardiovascular: Normal rate, regular rhythm and normal heart sounds.   No murmur heard.  Pulmonary/Chest: Breath sounds normal. No respiratory distress (mild increase in work of breathing). She has no decreased breath sounds. She has no wheezes.   No abnormal breath sounds noted. Good air movement in lungs     Abdominal: Soft. Bowel sounds are normal. She exhibits no distension. There is no abdominal tenderness.   Musculoskeletal:         General: No edema.   Neurological: She is alert and oriented to person, place, and time.   Skin: Skin is warm and dry.   Psychiatric: She has a normal mood and affect. Her behavior is normal. Judgment and thought content normal.     Personal Diagnostic Review    PFTs 10/20/2022  FEV1/FVC - 82%  FEV1 - 1.07 (73%)  FVC - 1.3L (68%)  TLC - 1.84L (41%)  DLCO - 62%  Bronchodilators:     PFTs 3/22/2022  FEV1/FVC - 85%  FEV1 - 1.29L (89%)  FVC - 1.52L (79%)  TLC - 2.5L (56%)  DLCO - 58%  Bronchodilators:    CT Thorax 6/13/2022  Bronchiectasis associated with interstitial disease (peripheral panlobular interlobular septal thickening) NSIP. 3mm non-calcifiec anterolateral and posterolateral inferior right upper lobe pulmonary nodules.    CT Thorax 9/2022 ([facility] records with no images available)  There is normal enhancement of the main pulmonary arteries and branches.   Atherosclerosis of the aorta with no dissection. Calcified coronary arteries. Heart size is within normal limits. There is interlobular and intralobular septal thickening throughout both lungs, mostly in the periphery. There is no focal " airspace consolidation. No significant pleural effusions.   There is no evidence of adenopathy.     Thoracic spondylosis.       6MWT 10/18/2022  No significant desaturation noted 96% -> 95% -> 96% (distance 235M).    No flowsheet data found.        Assessment:       1. Interstitial pulmonary disease, unspecified    2. Insomnia, unspecified type    3. Dyspnea on exertion    4. Gastroesophageal reflux disease without esophagitis          Outpatient Encounter Medications as of 12/6/2022   Medication Sig Dispense Refill    amLODIPine (NORVASC) 5 MG tablet       azelastine (ASTELIN) 137 mcg (0.1 %) nasal spray       JANUVIA 100 mg Tab       lisinopriL (PRINIVIL,ZESTRIL) 2.5 MG tablet Take 2.5 mg by mouth.      lovastatin (MEVACOR) 40 MG tablet       methotrexate 2.5 MG Tab Take 12.5 mg by mouth once a week.      mupirocin (BACTROBAN) 2 % ointment       omeprazole (PRILOSEC) 40 MG capsule       PROLIA 60 mg/mL Syrg       solifenacin (VESICARE) 5 MG tablet Take 5 mg by mouth.      predniSONE (DELTASONE) 20 MG tablet Take 2 tablets (40 mg total) by mouth once daily. (Patient not taking: Reported on 12/6/2022) 5 tablet 0     No facility-administered encounter medications on file as of 12/6/2022.     Orders Placed This Encounter   Procedures    CT Chest Without Contrast     Standing Status:   Future     Standing Expiration Date:   12/6/2023     Order Specific Question:   May the Radiologist modify the order per protocol to meet the clinical needs of the patient?     Answer:   Yes    Ambulatory referral/consult to Sleep Disorders     Standing Status:   Future     Standing Expiration Date:   1/6/2024     Referral Priority:   Routine     Referral Type:   Consultation     Requested Specialty:   Sleep Medicine     Number of Visits Requested:   1         Plan:       Problem List Items Addressed This Visit          Pulmonary    Interstitial pulmonary disease, unspecified - Primary    Overview     Reported history of sarcoid as a  young woman (teen years) diagnosed by what sounds like a bronchoscopy.  Currently with a more NSIP pattern on CT thorax that likely is attributable to her RA.  This is being managed by rheumatology with MTX  - continue MTX  - PFTs with decline in FVC, TLC.  DLCO improved.  Will repeat CT thorax at this time, as it has been 6 months since last study.  If fibrosis is worsening will discuss with rheum and consider initiation of anti-fibrotic therapy.  Patient is apprehensive about addition of more medications.  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    - continue therapy for RA            GI    Gastroesophageal reflux disease without esophagitis    Overview     Continue PPI            Other    Insomnia    Overview     Referral to sleep medicine for frequent nocturnal awakenings, and inadequate sleep concerning for insomnia.            RTC 3 months or sooner PRN    Bharat Rodriguez MD  [facility]

## 2022-12-13 ENCOUNTER — HOSPITAL ENCOUNTER (OUTPATIENT)
Dept: RADIOLOGY | Facility: HOSPITAL | Age: 82
Discharge: HOME | End: 2022-12-13
Attending: INTERNAL MEDICINE
Payer: MEDICARE

## 2022-12-13 DIAGNOSIS — J84.9 INTERSTITIAL PULMONARY DISEASE, UNSPECIFIED: ICD-10-CM

## 2022-12-13 PROCEDURE — 71250 CT THORAX DX C-: CPT | Mod: TC

## 2022-12-13 PROCEDURE — 71250 CT THORAX DX C-: CPT | Mod: 26,,, | Performed by: STUDENT IN AN ORGANIZED HEALTH CARE EDUCATION/TRAINING PROGRAM

## 2022-12-13 PROCEDURE — 71250 CT CHEST WITHOUT CONTRAST: ICD-10-PCS | Mod: 26,,, | Performed by: STUDENT IN AN ORGANIZED HEALTH CARE EDUCATION/TRAINING PROGRAM

## 2023-02-06 ENCOUNTER — TELEPHONE (OUTPATIENT)
Dept: PULMONOLOGY | Facility: CLINIC | Age: 83
End: 2023-02-06
Payer: MEDICARE

## 2023-02-06 NOTE — TELEPHONE ENCOUNTER
I spoke with Evans Carcamo today regarding her recent CT thorax.  It did not show evidence of lung disease progression.  We will continue to monitor and follow with rheumatology accordingly.  Repeat PFTs based on functional status at next visit.    Bharat Rodriguez MD  [facility]

## 2023-03-09 ENCOUNTER — OFFICE VISIT (OUTPATIENT)
Dept: PULMONOLOGY | Facility: CLINIC | Age: 83
End: 2023-03-09
Payer: MEDICARE

## 2023-03-09 VITALS
DIASTOLIC BLOOD PRESSURE: 72 MMHG | BODY MASS INDEX: 26.6 KG/M2 | RESPIRATION RATE: 18 BRPM | HEART RATE: 93 BPM | WEIGHT: 140.88 LBS | HEIGHT: 61 IN | SYSTOLIC BLOOD PRESSURE: 140 MMHG | OXYGEN SATURATION: 98 %

## 2023-03-09 DIAGNOSIS — G47.00 INSOMNIA, UNSPECIFIED TYPE: ICD-10-CM

## 2023-03-09 DIAGNOSIS — R06.09 DYSPNEA ON EXERTION: ICD-10-CM

## 2023-03-09 DIAGNOSIS — J84.9 INTERSTITIAL PULMONARY DISEASE, UNSPECIFIED: Primary | ICD-10-CM

## 2023-03-09 DIAGNOSIS — K21.9 GASTROESOPHAGEAL REFLUX DISEASE WITHOUT ESOPHAGITIS: ICD-10-CM

## 2023-03-09 PROCEDURE — 3288F FALL RISK ASSESSMENT DOCD: CPT | Mod: CPTII,S$GLB,, | Performed by: INTERNAL MEDICINE

## 2023-03-09 PROCEDURE — 1125F PR PAIN SEVERITY QUANTIFIED, PAIN PRESENT: ICD-10-PCS | Mod: CPTII,S$GLB,, | Performed by: INTERNAL MEDICINE

## 2023-03-09 PROCEDURE — 1159F MED LIST DOCD IN RCRD: CPT | Mod: CPTII,S$GLB,, | Performed by: INTERNAL MEDICINE

## 2023-03-09 PROCEDURE — 1160F PR REVIEW ALL MEDS BY PRESCRIBER/CLIN PHARMACIST DOCUMENTED: ICD-10-PCS | Mod: CPTII,S$GLB,, | Performed by: INTERNAL MEDICINE

## 2023-03-09 PROCEDURE — 3288F PR FALLS RISK ASSESSMENT DOCUMENTED: ICD-10-PCS | Mod: CPTII,S$GLB,, | Performed by: INTERNAL MEDICINE

## 2023-03-09 PROCEDURE — 3077F PR MOST RECENT SYSTOLIC BLOOD PRESSURE >= 140 MM HG: ICD-10-PCS | Mod: CPTII,S$GLB,, | Performed by: INTERNAL MEDICINE

## 2023-03-09 PROCEDURE — 3077F SYST BP >= 140 MM HG: CPT | Mod: CPTII,S$GLB,, | Performed by: INTERNAL MEDICINE

## 2023-03-09 PROCEDURE — 99214 PR OFFICE/OUTPT VISIT, EST, LEVL IV, 30-39 MIN: ICD-10-PCS | Mod: S$GLB,,, | Performed by: INTERNAL MEDICINE

## 2023-03-09 PROCEDURE — 3078F DIAST BP <80 MM HG: CPT | Mod: CPTII,S$GLB,, | Performed by: INTERNAL MEDICINE

## 2023-03-09 PROCEDURE — 1101F PT FALLS ASSESS-DOCD LE1/YR: CPT | Mod: CPTII,S$GLB,, | Performed by: INTERNAL MEDICINE

## 2023-03-09 PROCEDURE — 99214 OFFICE O/P EST MOD 30 MIN: CPT | Mod: S$GLB,,, | Performed by: INTERNAL MEDICINE

## 2023-03-09 PROCEDURE — 99999 PR PBB SHADOW E&M-EST. PATIENT-LVL III: CPT | Mod: PBBFAC,,, | Performed by: INTERNAL MEDICINE

## 2023-03-09 PROCEDURE — 1125F AMNT PAIN NOTED PAIN PRSNT: CPT | Mod: CPTII,S$GLB,, | Performed by: INTERNAL MEDICINE

## 2023-03-09 PROCEDURE — 1159F PR MEDICATION LIST DOCUMENTED IN MEDICAL RECORD: ICD-10-PCS | Mod: CPTII,S$GLB,, | Performed by: INTERNAL MEDICINE

## 2023-03-09 PROCEDURE — 1101F PR PT FALLS ASSESS DOC 0-1 FALLS W/OUT INJ PAST YR: ICD-10-PCS | Mod: CPTII,S$GLB,, | Performed by: INTERNAL MEDICINE

## 2023-03-09 PROCEDURE — 1160F RVW MEDS BY RX/DR IN RCRD: CPT | Mod: CPTII,S$GLB,, | Performed by: INTERNAL MEDICINE

## 2023-03-09 PROCEDURE — 99999 PR PBB SHADOW E&M-EST. PATIENT-LVL III: ICD-10-PCS | Mod: PBBFAC,,, | Performed by: INTERNAL MEDICINE

## 2023-03-09 PROCEDURE — 3078F PR MOST RECENT DIASTOLIC BLOOD PRESSURE < 80 MM HG: ICD-10-PCS | Mod: CPTII,S$GLB,, | Performed by: INTERNAL MEDICINE

## 2023-03-09 NOTE — PROGRESS NOTES
Subjective:       Patient ID: Evans Carcamo is a 82 y.o. female.    Chief Complaint: Follow-up (Patient here for 3 month f/u. Patient denies any new problems or concerns. 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    Tobacco/vape: never   Occupation: Pre-   Exertional capacity: limited severely due to shortness of breath. Able to walk to clinic from parking lot.  Prior lung disease: Was told sarcoid as a teenager, RA affecting her lungs.   Sputum production: Never  Allergies/sinusitis:Some history of allergies with the seasons, but not anymore  GERD/Aspiration: taking omeprazole  Pets: Daughter has 6 dogs, no increased symptoms around pets  Travel/TB: No extensive travel or living in foreign country  Respiratory regimen: never had an inhaler or breathing treatment  Prior cancer: None  Prior hospitalization/intubation:No prior hospitalizations for issues with her lungs or requiring intubation.  Snoring/apnea: Bad insomnia, No one sleeps with her, she does not know of episodes that she stops breathing    Today she is doing ok, but had tingling in her right hand and is undergoing therapy for her neck with PT and neurology.   Her respiratory symptoms have not worsened, and she denies worsening functional ability.    Review of Systems   Constitutional:  Negative for fever, chills, activity change and fatigue.   HENT:  Negative for rhinorrhea, sinus pressure, sore throat and trouble swallowing.    Respiratory:  Negative for apnea, snoring, cough, sputum production, shortness of breath, orthopnea, previous hospitialization due to pulmonary problems, use of rescue inhaler, somnolence and Paroxysmal Nocturnal Dyspnea.    Cardiovascular:  Negative for chest pain and leg swelling.   Musculoskeletal:  Negative for gait problem and myalgias.   Gastrointestinal:  Positive for nausea and vomiting. Negative for acid  "reflux.   Psychiatric/Behavioral:  Negative for confusion. The patient is not nervous/anxious.      Objective:      Vitals:    03/09/23 1015   BP: (!) 140/72   BP Location: Left arm   Patient Position: Sitting   BP Method: Medium (Manual)   Pulse: 93   Resp: 18   SpO2: 98%   Weight: 63.9 kg (140 lb 14 oz)   Height: 5' 1" (1.549 m)         Physical Exam   Constitutional: She is oriented to person, place, and time. She appears well-developed and well-nourished. No distress.   HENT:   Head: Normocephalic.   Cardiovascular: Normal rate, regular rhythm and normal heart sounds.   No murmur heard.  Pulmonary/Chest: Breath sounds normal. No respiratory distress (mild increase in work of breathing). She has no decreased breath sounds. She has no wheezes.   No abnormal breath sounds noted. Good air movement in lungs     Abdominal: Soft. Bowel sounds are normal. She exhibits no distension. There is no abdominal tenderness.   Musculoskeletal:         General: No edema.   Neurological: She is alert and oriented to person, place, and time.   Skin: Skin is warm and dry.   Psychiatric: She has a normal mood and affect. Her behavior is normal. Judgment and thought content normal.     Personal Diagnostic Review    CT Thorax 12/13/2022  No acute worsening of parenchymal lung disease.  Stable in comparison to 6/13/2022.    PFTs 10/20/2022  FEV1/FVC - 82%  FEV1 - 1.07 (73%)  FVC - 1.3L (68%)  TLC - 1.84L (41%)  DLCO - 62%  Bronchodilators:     PFTs 3/22/2022  FEV1/FVC - 85%  FEV1 - 1.29L (89%)  FVC - 1.52L (79%)  TLC - 2.5L (56%)  DLCO - 58%  Bronchodilators:    CT Thorax 6/13/2022  Bronchiectasis associated with interstitial disease (peripheral panlobular interlobular septal thickening) NSIP. 3mm non-calcifiec anterolateral and posterolateral inferior right upper lobe pulmonary nodules.    [place] [place] [place]  There is normal enhancement of the main pulmonary arteries and branches.   Atherosclerosis " of the aorta with no dissection. Calcified coronary arteries. Heart size is within normal limits. There is interlobular and intralobular septal thickening throughout both lungs, mostly in the periphery. There is no focal airspace consolidation. No significant pleural effusions.   There is no evidence of adenopathy.     Thoracic spondylosis.       6MWT 10/18/2022  No significant desaturation noted 96% -> 95% -> 96% (distance 235M).    No flowsheet data found.        Assessment:       1. Interstitial pulmonary disease, unspecified    2. Dyspnea on exertion    3. Gastroesophageal reflux disease without esophagitis    4. Insomnia, unspecified type            Outpatient Encounter Medications as of 3/9/2023   Medication Sig Dispense Refill    amLODIPine (NORVASC) 5 MG tablet       JANUVIA 100 mg Tab       lovastatin (MEVACOR) 40 MG tablet       methotrexate 2.5 MG Tab Take 12.5 mg by mouth once a week.      mupirocin (BACTROBAN) 2 % ointment       PROLIA 60 mg/mL Syrg       solifenacin (VESICARE) 5 MG tablet Take 5 mg by mouth.      [DISCONTINUED] azelastine (ASTELIN) 137 mcg (0.1 %) nasal spray       [DISCONTINUED] lisinopriL (PRINIVIL,ZESTRIL) 2.5 MG tablet Take 2.5 mg by mouth.      [DISCONTINUED] omeprazole (PRILOSEC) 40 MG capsule       [DISCONTINUED] predniSONE (DELTASONE) 20 MG tablet Take 2 tablets (40 mg total) by mouth once daily. (Patient not taking: Reported on 12/6/2022) 5 tablet 0     No facility-administered encounter medications on file as of 3/9/2023.     Orders Placed This Encounter   Procedures    Complete PFT w/ bronchodilator     Standing Status:   Future     Standing Expiration Date:   3/9/2024     Order Specific Question:   Release to patient     Answer:   Immediate         Plan:       Problem List Items Addressed This Visit          Pulmonary    Interstitial pulmonary disease, unspecified - Primary    Overview     Reported history of sarcoid as a young woman (teen years) diagnosed by what sounds  like a bronchoscopy.  Currently with a more NSIP pattern on CT thorax that likely is attributable to her RA.  This is being managed by rheumatology with MTX  - continue MTX, prolia p[er rheum.  - At last evaluation, PFTs had a decline in FVC, TLC.  DLCO improved.  Subsequent repeat CT thorax did not show progressive disease.  - continue PPI  - will repeat PFTs now for continued monitoring of pulmonary disease.            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    - continue therapy for RA            GI    Gastroesophageal reflux disease without esophagitis    Overview     Continue PPI            Other    Insomnia    Overview     She thinks this is due to anxiety, she has an active referral to sleep med.  I advised her to schedule this apt and go.           RTC 6 months or sooner PRN    Bharat Rodriguez MD  [facility]

## 2024-10-02 ENCOUNTER — TELEPHONE (OUTPATIENT)
Dept: PULMONOLOGY | Facility: CLINIC | Age: 84
End: 2024-10-02
Payer: MEDICARE

## 2024-10-02 NOTE — TELEPHONE ENCOUNTER
----- Message from Arianne sent at 10/2/2024 12:42 PM CDT -----  Regarding: Appt  Contact: Pt [phone]  Pt is calling to schedule a appt for shortness of breath no appts avail please call

## 2024-10-02 NOTE — TELEPHONE ENCOUNTER
Call was returned to patient in regards to scheduling an appointment. I informed patient that Dr Rodriguez does not have any available appointments. Patient is added to the waiting list. Patient verbalized understanding.